# Patient Record
Sex: FEMALE | Race: WHITE | NOT HISPANIC OR LATINO | Employment: PART TIME | URBAN - METROPOLITAN AREA
[De-identification: names, ages, dates, MRNs, and addresses within clinical notes are randomized per-mention and may not be internally consistent; named-entity substitution may affect disease eponyms.]

---

## 2018-11-13 PROBLEM — N81.10 FEMALE CYSTOCELE: Status: ACTIVE | Noted: 2018-11-13

## 2018-11-13 PROBLEM — N39.41 URGE INCONTINENCE: Status: ACTIVE | Noted: 2018-11-13

## 2018-11-13 PROBLEM — R39.15 URGENCY OF MICTURITION: Status: ACTIVE | Noted: 2018-11-13

## 2018-11-13 PROBLEM — R35.0 FREQUENCY OF MICTURITION: Status: ACTIVE | Noted: 2018-11-13

## 2023-10-17 ENCOUNTER — OFFICE VISIT (OUTPATIENT)
Dept: ORTHOPEDICS | Facility: CLINIC | Age: 63
End: 2023-10-17
Payer: COMMERCIAL

## 2023-10-17 ENCOUNTER — HOSPITAL ENCOUNTER (OUTPATIENT)
Dept: RADIOLOGY | Facility: HOSPITAL | Age: 63
Discharge: HOME | End: 2023-10-17
Attending: ORTHOPAEDIC SURGERY
Payer: COMMERCIAL

## 2023-10-17 VITALS — BODY MASS INDEX: 28.63 KG/M2 | WEIGHT: 188.94 LBS | HEIGHT: 68 IN

## 2023-10-17 DIAGNOSIS — M20.41 HAMMER TOE OF RIGHT FOOT: ICD-10-CM

## 2023-10-17 DIAGNOSIS — R52 PAIN: Primary | ICD-10-CM

## 2023-10-17 DIAGNOSIS — R52 PAIN: ICD-10-CM

## 2023-10-17 PROCEDURE — 1159F PR MEDICATION LIST DOCUMENTED IN MEDICAL RECORD: ICD-10-PCS | Mod: CPTII,S$GLB,, | Performed by: ORTHOPAEDIC SURGERY

## 2023-10-17 PROCEDURE — 3008F PR BODY MASS INDEX (BMI) DOCUMENTED: ICD-10-PCS | Mod: CPTII,S$GLB,, | Performed by: ORTHOPAEDIC SURGERY

## 2023-10-17 PROCEDURE — 73630 X-RAY EXAM OF FOOT: CPT | Mod: TC,RT

## 2023-10-17 PROCEDURE — 99999 PR PBB SHADOW E&M-NEW PATIENT-LVL III: CPT | Mod: PBBFAC,,, | Performed by: ORTHOPAEDIC SURGERY

## 2023-10-17 PROCEDURE — 99203 PR OFFICE/OUTPT VISIT, NEW, LEVL III, 30-44 MIN: ICD-10-PCS | Mod: S$GLB,,, | Performed by: ORTHOPAEDIC SURGERY

## 2023-10-17 PROCEDURE — 99203 OFFICE O/P NEW LOW 30 MIN: CPT | Mod: S$GLB,,, | Performed by: ORTHOPAEDIC SURGERY

## 2023-10-17 PROCEDURE — 1159F MED LIST DOCD IN RCRD: CPT | Mod: CPTII,S$GLB,, | Performed by: ORTHOPAEDIC SURGERY

## 2023-10-17 PROCEDURE — 3008F BODY MASS INDEX DOCD: CPT | Mod: CPTII,S$GLB,, | Performed by: ORTHOPAEDIC SURGERY

## 2023-10-17 PROCEDURE — 73630 X-RAY EXAM OF FOOT: CPT | Mod: 26,RT,, | Performed by: RADIOLOGY

## 2023-10-17 PROCEDURE — 99999 PR PBB SHADOW E&M-NEW PATIENT-LVL III: ICD-10-PCS | Mod: PBBFAC,,, | Performed by: ORTHOPAEDIC SURGERY

## 2023-10-17 PROCEDURE — 73630 XR FOOT COMPLETE 3 VIEW RIGHT: ICD-10-PCS | Mod: 26,RT,, | Performed by: RADIOLOGY

## 2023-11-16 DIAGNOSIS — M20.41 HAMMER TOE OF RIGHT FOOT: Primary | ICD-10-CM

## 2023-11-16 DIAGNOSIS — R52 PAIN: ICD-10-CM

## 2023-11-16 RX ORDER — MUPIROCIN 20 MG/G
OINTMENT TOPICAL
Status: CANCELLED | OUTPATIENT
Start: 2023-11-16

## 2023-11-16 RX ORDER — CEFAZOLIN SODIUM 2 G/50ML
2 SOLUTION INTRAVENOUS
Status: CANCELLED | OUTPATIENT
Start: 2023-11-16

## 2023-12-04 ENCOUNTER — OFFICE VISIT (OUTPATIENT)
Dept: ORTHOPEDICS | Facility: CLINIC | Age: 63
End: 2023-12-04
Payer: COMMERCIAL

## 2023-12-04 VITALS — BODY MASS INDEX: 32.08 KG/M2 | WEIGHT: 211 LBS

## 2023-12-04 DIAGNOSIS — M20.41 HAMMER TOE OF RIGHT FOOT: Primary | ICD-10-CM

## 2023-12-04 PROCEDURE — 99499 NO LOS: ICD-10-PCS | Mod: S$GLB,,, | Performed by: PHYSICIAN ASSISTANT

## 2023-12-04 PROCEDURE — 99499 UNLISTED E&M SERVICE: CPT | Mod: S$GLB,,, | Performed by: PHYSICIAN ASSISTANT

## 2023-12-04 PROCEDURE — 99999 PR PBB SHADOW E&M-EST. PATIENT-LVL IV: ICD-10-PCS | Mod: PBBFAC,,, | Performed by: PHYSICIAN ASSISTANT

## 2023-12-04 PROCEDURE — 99999 PR PBB SHADOW E&M-EST. PATIENT-LVL IV: CPT | Mod: PBBFAC,,, | Performed by: PHYSICIAN ASSISTANT

## 2023-12-04 RX ORDER — OXYCODONE HYDROCHLORIDE 5 MG/1
5 TABLET ORAL EVERY 6 HOURS PRN
Qty: 15 TABLET | Refills: 0 | Status: SHIPPED | OUTPATIENT
Start: 2023-12-04

## 2023-12-04 RX ORDER — GABAPENTIN 300 MG/1
300 CAPSULE ORAL NIGHTLY
Qty: 42 CAPSULE | Refills: 0 | Status: SHIPPED | OUTPATIENT
Start: 2023-12-04 | End: 2024-01-25 | Stop reason: SDUPTHER

## 2023-12-04 RX ORDER — ACETAMINOPHEN 500 MG
1000 TABLET ORAL 3 TIMES DAILY
Qty: 42 TABLET | Refills: 0 | Status: SHIPPED | OUTPATIENT
Start: 2023-12-04 | End: 2023-12-20

## 2023-12-04 RX ORDER — NAPROXEN 500 MG/1
500 TABLET ORAL 2 TIMES DAILY WITH MEALS
Qty: 28 TABLET | Refills: 0 | Status: SHIPPED | OUTPATIENT
Start: 2023-12-04 | End: 2023-12-27

## 2023-12-04 RX ORDER — ONDANSETRON 4 MG/1
4 TABLET, FILM COATED ORAL EVERY 8 HOURS PRN
Qty: 15 TABLET | Refills: 0 | Status: SHIPPED | OUTPATIENT
Start: 2023-12-04

## 2023-12-04 NOTE — H&P (VIEW-ONLY)
Subjective:      Patient ID: Amanda Valdez is a 63 y.o. female.    Chief Complaint: Pre-op Exam and Pain of the Right Foot    Amanda is a 63 y.o. female here today for a pre-operative visit in preparation for a right 3rd toe hammertoe correction to be performed by Dr. Lawrence on 12/13/23.  She was last seen and treated in the clinic on 10/17/23.  There has been no significant change in their past medical or orthopedic status since the previous visit.  No fever, chills, malaise or unexplained weight change.     Past Medical History:   Diagnosis Date    Allergic sinusitis     Curvature of spine     Elbow injury 12/2018    RIGHT     Female cystocele     General anesthetics causing adverse effect in therapeutic use     DELAYED EMERGENCE    MRSA infection 05/2018    CULTURE DONE 12/14/18    PONV (postoperative nausea and vomiting)     Urinary urgency     Wears glasses      Past Surgical History:   Procedure Laterality Date    BLADDER SUSPENSION  2014    COLONOSCOPY      CREATION OF ANTERIOR VAGINAL WALL SLING N/A 12/20/2018    Procedure: SLING-ANTERIOR VAGINAL WALL;  Surgeon: Austin Voss MD;  Location: [facility] OR;  Service: Urology;  Laterality: N/A;    DILATION OF URETHRA N/A 12/20/2018    Procedure: DILATION, URETHRA;  Surgeon: Austin Voss MD;  Location: [facility] OR;  Service: Urology;  Laterality: N/A;    HYSTERECTOMY      INCISION AND DRAINAGE OF WOUND Right     LEG, SPIDER BITE    PUBOVAGINAL SLING  12/20/2018    with urethral dilation     Social History     Occupational History    Not on file   Tobacco Use    Smoking status: Never    Smokeless tobacco: Never   Substance and Sexual Activity    Alcohol use: No    Drug use: No    Sexual activity: Not on file      Review of Systems   Constitutional: Negative for chills and fever.   HENT:  Negative for congestion.    Eyes:  Negative for redness.   Respiratory:  Negative for shortness of breath.    Skin:  Negative for rash.   Gastrointestinal:  Negative for  abdominal pain, nausea and vomiting.   Neurological:  Negative for dizziness and seizures.   Psychiatric/Behavioral:  Negative for altered mental status.      Current Outpatient Medications on File Prior to Visit   Medication Sig Dispense Refill    alpha lipoic acid 300 mg Cap Take 1 tablet by mouth every morning.      cholecalciferol, vitamin D3, (VITAMIN D3 ORAL) Take 5,000 Int'l Units by mouth once daily.      coenzyme Q10 (CO Q-10) 100 mg capsule Take 100 mg by mouth once daily.      estradiol (ESTRACE) 1 MG tablet Take 1 mg by mouth every morning.      FLAXSEED OIL-OMEGA 3,6,9 ORAL Take 1 capsule by mouth every morning.      glucosamine/chondr nunez A sod (OSTEO BI-FLEX ORAL) Take 1 tablet by mouth once daily.      oxybutynin (DITROPAN-XL) 10 MG 24 hr tablet Take 10 mg by mouth every morning.      vitamin B complex (B COMPLEX ORAL) Take 1 tablet by mouth once daily.      [DISCONTINUED] amoxicillin-clavulanate 875-125mg (AUGMENTIN) 875-125 mg per tablet Take 1 tablet by mouth every 12 (twelve) hours. TX RECENT SINUS CONGESTION, TAKE UNTIL COMPLETED      [DISCONTINUED] montelukast (SINGULAIR) 10 mg tablet Take 10 mg by mouth every evening. RECENT TX SINUS CONGESTION       No current facility-administered medications on file prior to visit.     Review of patient's allergies indicates:  No Known Allergies      Objective:      Vitals:    12/04/23 1056   Weight: 95.7 kg (211 lb)     Ortho Exam     Gen: WD, WN in NAD   HEENT: NC/AT   Heart: RR   Resp: unlabored breathing   Skin: intact, no lesions pertinent to the surgical site   Assessment:       1. Hammer toe of right foot, 3rd toe          Plan:       Surgery per Dr. Lawrence

## 2023-12-04 NOTE — PROGRESS NOTES
Amanda is a 63 y.o. female here today for a pre-operative visit in preparation for a right 3rd toe hammertoe correction to be performed by Dr. Lawrence on 12/13/23.  She was last seen and treated in the clinic on 10/17/23.  There has been no significant change in their past medical or orthopedic status since the previous visit.  No fever, chills, malaise or unexplained weight change.     Allergies, medications, past medical history and past surgical history were reviewed with the patient.     Physical examination was performed.     Current care, treatment plan, precautions, activity level/ modifications, limitations, rehabilitation exercises and proposed future treatment were discussed with the patient. We discussed the need to monitor for changes in symptoms and condition and report them to the physician.    Consents were signed.   Patient has crutches and scooter and will bring with them the day of surgery. They have been counseled on proper use of the assistive device.   Post operative pain medications sent to [facility]  Patient mentioned significant post op N/V.  Advised to inform Anesthesia and zofran prescription provided    -Discussed COVID19 with the patient, they are aware of our current policies and procedures, were given the option of delaying surgery, and they elect to proceed.        Pre, gianluca, and post operative procedure and expectations were discussed.  Questions were answered. The patient has been educated and is ready to proceed with surgery.  Approximately 30 minutes was spent discussing surgical outcomes, plans, procedures, pre, gianluca, and post operative expectations and care. The risks, benefits and alternatives to surgery were discussed with the patient at great length.  These include bleeding, infection, vessel/nerve damage, pain, numbness, tingling, complex regional pain syndrome, hardware/surgical failure, need for further surgery, malunion, nonunion, DVT, PE, arthritis and death. He also  understands that the risks of surgery may be greater for some patients due to health or lifestyle issues, such as a current condition or a history of heart disease, obesity, clotting disorders, recurrent infections, smoking, sedentary lifestyle, or noncompliance with medications, therapy, or followup. The degree of the increased risk is hard to estimate with any degree of precision.  Patient states an understanding and wishes to proceed with surgery.   All questions were answered.  No guarantees were implied or stated.  Informed consent was obtained  The patient will contact us if the have any questions, concerns, and changes in their medical condition prior to surgery.

## 2023-12-04 NOTE — H&P
Subjective:      Patient ID: Amanda Valdez is a 63 y.o. female.    Chief Complaint: Pre-op Exam and Pain of the Right Foot    Amanda is a 63 y.o. female here today for a pre-operative visit in preparation for a right 3rd toe hammertoe correction to be performed by Dr. Lawrence on 12/13/23.  She was last seen and treated in the clinic on 10/17/23.  There has been no significant change in their past medical or orthopedic status since the previous visit.  No fever, chills, malaise or unexplained weight change.     Past Medical History:   Diagnosis Date    Allergic sinusitis     Curvature of spine     Elbow injury 12/2018    RIGHT     Female cystocele     General anesthetics causing adverse effect in therapeutic use     DELAYED EMERGENCE    MRSA infection 05/2018    CULTURE DONE 12/14/18    PONV (postoperative nausea and vomiting)     Urinary urgency     Wears glasses      Past Surgical History:   Procedure Laterality Date    BLADDER SUSPENSION  2014    COLONOSCOPY      CREATION OF ANTERIOR VAGINAL WALL SLING N/A 12/20/2018    Procedure: SLING-ANTERIOR VAGINAL WALL;  Surgeon: Austni Voss MD;  Location: [facility] OR;  Service: Urology;  Laterality: N/A;    DILATION OF URETHRA N/A 12/20/2018    Procedure: DILATION, URETHRA;  Surgeon: Austin Voss MD;  Location: [facility] OR;  Service: Urology;  Laterality: N/A;    HYSTERECTOMY      INCISION AND DRAINAGE OF WOUND Right     LEG, SPIDER BITE    PUBOVAGINAL SLING  12/20/2018    with urethral dilation     Social History     Occupational History    Not on file   Tobacco Use    Smoking status: Never    Smokeless tobacco: Never   Substance and Sexual Activity    Alcohol use: No    Drug use: No    Sexual activity: Not on file      Review of Systems   Constitutional: Negative for chills and fever.   HENT:  Negative for congestion.    Eyes:  Negative for redness.   Respiratory:  Negative for shortness of breath.    Skin:  Negative for rash.   Gastrointestinal:  Negative for  abdominal pain, nausea and vomiting.   Neurological:  Negative for dizziness and seizures.   Psychiatric/Behavioral:  Negative for altered mental status.      Current Outpatient Medications on File Prior to Visit   Medication Sig Dispense Refill    alpha lipoic acid 300 mg Cap Take 1 tablet by mouth every morning.      cholecalciferol, vitamin D3, (VITAMIN D3 ORAL) Take 5,000 Int'l Units by mouth once daily.      coenzyme Q10 (CO Q-10) 100 mg capsule Take 100 mg by mouth once daily.      estradiol (ESTRACE) 1 MG tablet Take 1 mg by mouth every morning.      FLAXSEED OIL-OMEGA 3,6,9 ORAL Take 1 capsule by mouth every morning.      glucosamine/chondr nunez A sod (OSTEO BI-FLEX ORAL) Take 1 tablet by mouth once daily.      oxybutynin (DITROPAN-XL) 10 MG 24 hr tablet Take 10 mg by mouth every morning.      vitamin B complex (B COMPLEX ORAL) Take 1 tablet by mouth once daily.      [DISCONTINUED] amoxicillin-clavulanate 875-125mg (AUGMENTIN) 875-125 mg per tablet Take 1 tablet by mouth every 12 (twelve) hours. TX RECENT SINUS CONGESTION, TAKE UNTIL COMPLETED      [DISCONTINUED] montelukast (SINGULAIR) 10 mg tablet Take 10 mg by mouth every evening. RECENT TX SINUS CONGESTION       No current facility-administered medications on file prior to visit.     Review of patient's allergies indicates:  No Known Allergies      Objective:      Vitals:    12/04/23 1056   Weight: 95.7 kg (211 lb)     Ortho Exam     Gen: WD, WN in NAD   HEENT: NC/AT   Heart: RR   Resp: unlabored breathing   Skin: intact, no lesions pertinent to the surgical site   Assessment:       1. Hammer toe of right foot, 3rd toe          Plan:       Surgery per Dr. Lawrence

## 2023-12-11 ENCOUNTER — PATIENT MESSAGE (OUTPATIENT)
Dept: PREADMISSION TESTING | Facility: HOSPITAL | Age: 63
End: 2023-12-11
Payer: COMMERCIAL

## 2023-12-11 NOTE — ANESTHESIA PAT ROS NOTE
12/11/2023  Amanda Valdez is a 63 y.o., female.      Pre-op Assessment    I have reviewed the Patient Summary Reports.       I have reviewed the Medications.     Review of Systems  Anesthesia Hx:  No problems with previous Anesthesia   History of prior surgery of interest to airway management or planning:  Previous anesthesia: General 12/20/2018  Anterior Vag Wall Sling, dilation of Urethra with general anesthesia.  Procedure performed at an [facility].      Airway issues documented on chart review include mask, easy, laryngeal mask airway used     Denies Family Hx of Anesthesia complications.   Personal Hx of Anesthesia complications, Post-Operative Nausea/Vomiting, in the past, but not with recent anesthetics / prophylaxis          Slow To Awaken/Delayed Emergence and mild, somewhat sensitive to sedation / narcotics          Social:  Non-Smoker, No Alcohol Use       Hematology/Oncology:    Oncology Normal    -- Denies Anemia:               Hematology Comments: H/O MRSA infection, culture done 12/14/2018                    EENT/Dental:  chronic allergic rhinitis Allergic sinusitis, wears glasses          Cardiovascular:  Exercise tolerance: good       Denies CAD.       Denies Angina.    PVD   Denies LANTIGUA.  ECG has been reviewed. Edema, Varicose veins of unspecified lower extremity with other complications, venous reflux study reviewed with sig reflux  compression stockings 15-20 mmHg during day only, elevate legs at rest as tolerated, Elevated triglycerides per outside lab                           Pulmonary:  Pulmonary Normal    Denies Asthma.   Denies Shortness of breath.                  Renal/:   Denies Chronic Renal Disease.   H/O Female cystocele,  Urinary urgency, S/P Bladder Suspension, Creation of Anterior Vaginal Wall Sling, Dilation of Urethra             Hepatic/GI:  Hepatic/GI Normal      Denies GERD. Denies Liver Disease.            Musculoskeletal:     Hammer toe of right foot, 3rd toe, Pain,   Curvature of spine, H/O right elbow injury              OB/GYN/PEDS:  Hysterectomy           Neurological:    Denies CVA.    Denies Headaches. Denies Seizures.    Other specified mononeuropathies of right lower limb,  H/O right 1st metatarsal osteotomy and right 2nd crossover hammer toe correction,  Knee Surgery      Peripheral Neuropathy                          Endocrine:  Denies Diabetes. Denies Hypothyroidism.        Obesity / BMI > 30  Dermatological:  I&D of wound on right leg- spider bite   Psych:  Psychiatric Normal                   Past Medical History:   Diagnosis Date    Allergic sinusitis     Curvature of spine     Elbow injury 12/2018    RIGHT     Female cystocele     General anesthetics causing adverse effect in therapeutic use     DELAYED EMERGENCE    MRSA infection 05/2018    CULTURE DONE 12/14/18    PONV (postoperative nausea and vomiting)     Urinary urgency     Wears glasses      Past Surgical History:   Procedure Laterality Date    BLADDER SUSPENSION  2014    COLONOSCOPY      CREATION OF ANTERIOR VAGINAL WALL SLING N/A 12/20/2018    Procedure: SLING-ANTERIOR VAGINAL WALL;  Surgeon: Austin Voss MD;  Location: [facility] OR;  Service: Urology;  Laterality: N/A;    DILATION OF URETHRA N/A 12/20/2018    Procedure: DILATION, URETHRA;  Surgeon: Austin Voss MD;  Location: [facility] OR;  Service: Urology;  Laterality: N/A;    HYSTERECTOMY      INCISION AND DRAINAGE OF WOUND Right     LEG, SPIDER BITE    PUBOVAGINAL SLING  12/20/2018    with urethral dilation       Anesthesia Assessment: Preoperative EQUATION    Planned Procedure: Procedure(s) (LRB):  CORRECTION, HAMMER TOE 3rd toe (Right)  Requested Anesthesia Type:Choice  Surgeon: Austin Lawrence MD  Service: Orthopedics  Known or anticipated Date of Surgery:12/13/2023    Surgeon notes: reviewed    Electronic QUestionnaire Assessment  completed via nurse interview with patient.        Triage considerations:     The patient has no apparent active cardiac condition (No unstable coronary Syndrome such as severe unstable angina or recent [<1 month] myocardial infarction, decompensated CHF, severe valvular   disease or significant arrhythmia)    Previous anesthesia records:LMA General, Easy airway, No problems, and Hx PONV    Last PCP note: 3-6 months ago , outside [facility]   Subspecialty notes: Vascular Surgery    Other important co-morbidities: Obesity       EKG 4/26/2022:  Vent. Rate : 058 BPM     Atrial Rate : 058 BPM      P-R Int : 158 ms          QRS Dur : 098 ms       QT Int : 434 ms       P-R-T Axes : 070 068 069 degrees      QTc Int : 426 ms   Sinus bradycardia   Otherwise normal ECG   No previous ECGs available   Confirmed by Toyin Castillo MD ([id number]) on 4/26/2022 12:16:13 PM        Echo 8/4/2023:  1.The study quality is average. 2.The left ventricle is normal in size. Global left ventricular systolic function is normal. The left ventricular ejection fraction is 60%. Left ventricular diastolic function is normal. 3.Mild (1+) pulmonic regurgitation. 4.The pulmonary artery systolic pressure is 28 mmHg.                Lower Extremity Ultrasound 8/2/2023:  1.The study quality is average. 2.Patent bilateral lower extremity deep veins with no evidence of thrombus noted. 3.Left deep reflux: 1.2 sec in the common femoral vein, 1.5 sec in the femoral vein, and 13 sec in the popliteal vein.4.Reflux is noted in the right GSV and SSV.5.Varicosities are noted in the right thigh with 2.1 sec of reflux measuring 4.6mm.  Large  noted at the right upper calf with 0.7 sec of reflux measuring 3.6mm.  Large  noted at the right mid lower calf with 0.7 sec of reflux measuring 5.2mm.6.Reflux is noted in the left GSV and SSV.7.Varicosities are noted in the left calf with 1.6 sec of reflux measuring 3.5mm.   noted at the left upper  calf with 0.8 sec of reflux measuring 2.3mm.  Large peforator noted at the left mid calf with 0.5 sec of reflux measuring 3.6mm.        Instructions given. (See in Nurse's note)      Ht: 5'8  Wt: 211 lb  BMI: 32.08  Vaccinated

## 2023-12-12 ENCOUNTER — TELEPHONE (OUTPATIENT)
Dept: ORTHOPEDICS | Facility: CLINIC | Age: 63
End: 2023-12-12
Payer: COMMERCIAL

## 2023-12-12 ENCOUNTER — ANESTHESIA EVENT (OUTPATIENT)
Dept: SURGERY | Facility: HOSPITAL | Age: 63
End: 2023-12-12
Payer: COMMERCIAL

## 2023-12-12 NOTE — TELEPHONE ENCOUNTER
I spoke with pt,confirmed surgery arrival time of 10:10am.Joan MCKINNEY.nothing to eat or drink after midnight. Pt,verbalized understanding.

## 2023-12-13 ENCOUNTER — ANESTHESIA (OUTPATIENT)
Dept: SURGERY | Facility: HOSPITAL | Age: 63
End: 2023-12-13
Payer: COMMERCIAL

## 2023-12-13 ENCOUNTER — HOSPITAL ENCOUNTER (OUTPATIENT)
Facility: HOSPITAL | Age: 63
Discharge: HOME | End: 2023-12-13
Attending: ORTHOPAEDIC SURGERY | Admitting: ORTHOPAEDIC SURGERY
Payer: COMMERCIAL

## 2023-12-13 VITALS
TEMPERATURE: 98 F | WEIGHT: 211 LBS | SYSTOLIC BLOOD PRESSURE: 142 MMHG | HEART RATE: 63 BPM | RESPIRATION RATE: 17 BRPM | DIASTOLIC BLOOD PRESSURE: 69 MMHG | OXYGEN SATURATION: 99 % | BODY MASS INDEX: 31.98 KG/M2 | HEIGHT: 68 IN

## 2023-12-13 DIAGNOSIS — M20.41 HAMMER TOE OF RIGHT FOOT: ICD-10-CM

## 2023-12-13 DIAGNOSIS — R52 PAIN: ICD-10-CM

## 2023-12-13 PROCEDURE — 28285 PR REPAIR OF HAMMERTOE,ONE: ICD-10-PCS | Mod: T7,,, | Performed by: ORTHOPAEDIC SURGERY

## 2023-12-13 PROCEDURE — 28285 REPAIR OF HAMMERTOE: CPT | Mod: T7,,, | Performed by: ORTHOPAEDIC SURGERY

## 2023-12-13 PROCEDURE — 63600175 PHARM REV CODE 636 W HCPCS: Performed by: ANESTHESIOLOGY

## 2023-12-13 PROCEDURE — 99900035 HC TECH TIME PER 15 MIN (STAT)

## 2023-12-13 PROCEDURE — 64450 POPLITEAL SINGLE SHOT NERVE BLOCK: ICD-10-PCS | Mod: 59,RT,, | Performed by: ANESTHESIOLOGY

## 2023-12-13 PROCEDURE — 64447: ICD-10-PCS | Mod: 59,RT,, | Performed by: ANESTHESIOLOGY

## 2023-12-13 PROCEDURE — D9220A PRA ANESTHESIA: Mod: CRNA,,, | Performed by: NURSE ANESTHETIST, CERTIFIED REGISTERED

## 2023-12-13 PROCEDURE — C1713 ANCHOR/SCREW BN/BN,TIS/BN: HCPCS | Performed by: ORTHOPAEDIC SURGERY

## 2023-12-13 PROCEDURE — 37000008 HC ANESTHESIA 1ST 15 MINUTES: Performed by: ORTHOPAEDIC SURGERY

## 2023-12-13 PROCEDURE — 27201423 OPTIME MED/SURG SUP & DEVICES STERILE SUPPLY: Performed by: ORTHOPAEDIC SURGERY

## 2023-12-13 PROCEDURE — 25000003 PHARM REV CODE 250: Performed by: ORTHOPAEDIC SURGERY

## 2023-12-13 PROCEDURE — 63600175 PHARM REV CODE 636 W HCPCS: Performed by: NURSE ANESTHETIST, CERTIFIED REGISTERED

## 2023-12-13 PROCEDURE — 71000015 HC POSTOP RECOV 1ST HR: Performed by: ORTHOPAEDIC SURGERY

## 2023-12-13 PROCEDURE — D9220A PRA ANESTHESIA: ICD-10-PCS | Mod: ANES,,, | Performed by: ANESTHESIOLOGY

## 2023-12-13 PROCEDURE — 64447 NJX AA&/STRD FEMORAL NRV IMG: CPT | Performed by: ANESTHESIOLOGY

## 2023-12-13 PROCEDURE — 94761 N-INVAS EAR/PLS OXIMETRY MLT: CPT

## 2023-12-13 PROCEDURE — 36000707: Performed by: ORTHOPAEDIC SURGERY

## 2023-12-13 PROCEDURE — D9220A PRA ANESTHESIA: ICD-10-PCS | Mod: CRNA,,, | Performed by: NURSE ANESTHETIST, CERTIFIED REGISTERED

## 2023-12-13 PROCEDURE — 25000003 PHARM REV CODE 250: Performed by: NURSE ANESTHETIST, CERTIFIED REGISTERED

## 2023-12-13 PROCEDURE — 36000706: Performed by: ORTHOPAEDIC SURGERY

## 2023-12-13 PROCEDURE — 63600175 PHARM REV CODE 636 W HCPCS: Performed by: ORTHOPAEDIC SURGERY

## 2023-12-13 PROCEDURE — 64445 NJX AA&/STRD SCIATIC NRV IMG: CPT | Performed by: ANESTHESIOLOGY

## 2023-12-13 PROCEDURE — 64450 NJX AA&/STRD OTHER PN/BRANCH: CPT | Mod: 59,RT,, | Performed by: ANESTHESIOLOGY

## 2023-12-13 PROCEDURE — D9220A PRA ANESTHESIA: Mod: ANES,,, | Performed by: ANESTHESIOLOGY

## 2023-12-13 PROCEDURE — 37000009 HC ANESTHESIA EA ADD 15 MINS: Performed by: ORTHOPAEDIC SURGERY

## 2023-12-13 PROCEDURE — 25000003 PHARM REV CODE 250: Performed by: ANESTHESIOLOGY

## 2023-12-13 PROCEDURE — 71000033 HC RECOVERY, INTIAL HOUR: Performed by: ORTHOPAEDIC SURGERY

## 2023-12-13 DEVICE — IMPLANTABLE DEVICE: Type: IMPLANTABLE DEVICE | Site: FOOT | Status: FUNCTIONAL

## 2023-12-13 RX ORDER — LIDOCAINE HYDROCHLORIDE 20 MG/ML
INJECTION INTRAVENOUS
Status: DISCONTINUED | OUTPATIENT
Start: 2023-12-13 | End: 2023-12-13

## 2023-12-13 RX ORDER — FAMOTIDINE 10 MG/ML
INJECTION INTRAVENOUS
Status: DISCONTINUED | OUTPATIENT
Start: 2023-12-13 | End: 2023-12-13

## 2023-12-13 RX ORDER — SCOLOPAMINE TRANSDERMAL SYSTEM 1 MG/1
PATCH, EXTENDED RELEASE TRANSDERMAL
Status: DISCONTINUED
Start: 2023-12-13 | End: 2023-12-13 | Stop reason: HOSPADM

## 2023-12-13 RX ORDER — ROPIVACAINE HYDROCHLORIDE 5 MG/ML
INJECTION, SOLUTION EPIDURAL; INFILTRATION; PERINEURAL
Status: COMPLETED | OUTPATIENT
Start: 2023-12-13 | End: 2023-12-13

## 2023-12-13 RX ORDER — ACETAMINOPHEN 500 MG
1000 TABLET ORAL ONCE
Status: COMPLETED | OUTPATIENT
Start: 2023-12-13 | End: 2023-12-13

## 2023-12-13 RX ORDER — MUPIROCIN 20 MG/G
OINTMENT TOPICAL 2 TIMES DAILY
Status: DISCONTINUED | OUTPATIENT
Start: 2023-12-13 | End: 2023-12-13 | Stop reason: HOSPADM

## 2023-12-13 RX ORDER — FENTANYL CITRATE 50 UG/ML
25-200 INJECTION, SOLUTION INTRAMUSCULAR; INTRAVENOUS
Status: DISCONTINUED | OUTPATIENT
Start: 2023-12-13 | End: 2023-12-13 | Stop reason: HOSPADM

## 2023-12-13 RX ORDER — MUPIROCIN 20 MG/G
OINTMENT TOPICAL
Status: DISCONTINUED | OUTPATIENT
Start: 2023-12-13 | End: 2023-12-13 | Stop reason: HOSPADM

## 2023-12-13 RX ORDER — PROPOFOL 10 MG/ML
VIAL (ML) INTRAVENOUS CONTINUOUS PRN
Status: DISCONTINUED | OUTPATIENT
Start: 2023-12-13 | End: 2023-12-13

## 2023-12-13 RX ORDER — PROPOFOL 10 MG/ML
VIAL (ML) INTRAVENOUS
Status: DISCONTINUED | OUTPATIENT
Start: 2023-12-13 | End: 2023-12-13

## 2023-12-13 RX ORDER — ONDANSETRON 2 MG/ML
INJECTION INTRAMUSCULAR; INTRAVENOUS
Status: DISCONTINUED | OUTPATIENT
Start: 2023-12-13 | End: 2023-12-13

## 2023-12-13 RX ORDER — SODIUM CHLORIDE 0.9 % (FLUSH) 0.9 %
10 SYRINGE (ML) INJECTION
Status: DISCONTINUED | OUTPATIENT
Start: 2023-12-13 | End: 2023-12-13 | Stop reason: HOSPADM

## 2023-12-13 RX ORDER — CELECOXIB 200 MG/1
400 CAPSULE ORAL ONCE
Status: COMPLETED | OUTPATIENT
Start: 2023-12-13 | End: 2023-12-13

## 2023-12-13 RX ORDER — SCOLOPAMINE TRANSDERMAL SYSTEM 1 MG/1
1 PATCH, EXTENDED RELEASE TRANSDERMAL
Status: DISCONTINUED | OUTPATIENT
Start: 2023-12-13 | End: 2023-12-13 | Stop reason: HOSPADM

## 2023-12-13 RX ORDER — MIDAZOLAM HYDROCHLORIDE 1 MG/ML
.5-4 INJECTION INTRAMUSCULAR; INTRAVENOUS
Status: DISCONTINUED | OUTPATIENT
Start: 2023-12-13 | End: 2023-12-13 | Stop reason: HOSPADM

## 2023-12-13 RX ORDER — ONDANSETRON 2 MG/ML
4 INJECTION INTRAMUSCULAR; INTRAVENOUS ONCE AS NEEDED
Status: CANCELLED | OUTPATIENT
Start: 2023-12-13 | End: 2035-05-11

## 2023-12-13 RX ORDER — SODIUM CHLORIDE 0.9 % (FLUSH) 0.9 %
3 SYRINGE (ML) INJECTION
Status: CANCELLED | OUTPATIENT
Start: 2023-12-13

## 2023-12-13 RX ADMIN — FAMOTIDINE 20 MG: 10 INJECTION, SOLUTION INTRAVENOUS at 01:12

## 2023-12-13 RX ADMIN — ONDANSETRON 4 MG: 2 INJECTION INTRAMUSCULAR; INTRAVENOUS at 01:12

## 2023-12-13 RX ADMIN — PROPOFOL 50 MG: 10 INJECTION, EMULSION INTRAVENOUS at 01:12

## 2023-12-13 RX ADMIN — SODIUM CHLORIDE: 9 INJECTION, SOLUTION INTRAVENOUS at 12:12

## 2023-12-13 RX ADMIN — MUPIROCIN: 20 OINTMENT TOPICAL at 10:12

## 2023-12-13 RX ADMIN — LIDOCAINE HYDROCHLORIDE 75 MG: 20 INJECTION INTRAVENOUS at 01:12

## 2023-12-13 RX ADMIN — CELECOXIB 400 MG: 200 CAPSULE ORAL at 10:12

## 2023-12-13 RX ADMIN — ROPIVACAINE HYDROCHLORIDE 30 ML: 5 INJECTION EPIDURAL; INFILTRATION; PERINEURAL at 12:12

## 2023-12-13 RX ADMIN — MIDAZOLAM 1 MG: 1 INJECTION INTRAMUSCULAR; INTRAVENOUS at 12:12

## 2023-12-13 RX ADMIN — ROPIVACAINE HYDROCHLORIDE 10 ML: 5 INJECTION, SOLUTION EPIDURAL; INFILTRATION; PERINEURAL at 12:12

## 2023-12-13 RX ADMIN — PROPOFOL 100 MCG/KG/MIN: 10 INJECTION, EMULSION INTRAVENOUS at 01:12

## 2023-12-13 RX ADMIN — CEFAZOLIN 2 G: 2 INJECTION, POWDER, FOR SOLUTION INTRAMUSCULAR; INTRAVENOUS at 01:12

## 2023-12-13 RX ADMIN — ACETAMINOPHEN 1000 MG: 500 TABLET ORAL at 10:12

## 2023-12-13 NOTE — ANESTHESIA PROCEDURE NOTES
Popliteal Single Shot Nerve Block    Patient location during procedure: pre-op   Block not for primary anesthetic.  Reason for block: at surgeon's request and post-op pain management   Post-op Pain Location: R foot pain   Start time: 12/13/2023 12:25 PM  Timeout: 12/13/2023 12:25 PM   End time: 12/13/2023 12:27 PM    Staffing  Authorizing Provider: Je Petty MD  Performing Provider: Je Petty MD    Staffing  Performed by: Je Petty MD  Authorized by: eJ Petty MD    Preanesthetic Checklist  Completed: patient identified, IV checked, site marked, risks and benefits discussed, surgical consent, monitors and equipment checked, pre-op evaluation and timeout performed  Peripheral Block  Patient position: supine  Prep: ChloraPrep  Patient monitoring: heart rate, cardiac monitor, continuous pulse ox, continuous capnometry and frequent blood pressure checks  Block type: popliteal  Laterality: right  Injection technique: single shot  Needle  Needle type: Stimuplex   Needle gauge: 21 G  Needle length: 4 in  Needle localization: anatomical landmarks and ultrasound guidance   -ultrasound image captured on disc.  Assessment  Injection assessment: negative aspiration, negative parasthesia and local visualized surrounding nerve  Paresthesia pain: none  Heart rate change: no  Slow fractionated injection: yes  Pain Tolerance: comfortable throughout block and no complaints  Medications:    Medications: ropivacaine (NAROPIN) injection 0.5% - Perineural   30 mL - 12/13/2023 12:26:00 PM    Additional Notes  VSS.  [facility] RN monitoring vitals throughout procedure.  Patient tolerated procedure well.

## 2023-12-13 NOTE — ANESTHESIA PROCEDURE NOTES
Aductor Canal Single Shot Nerve Block    Patient location during procedure: pre-op   Block not for primary anesthetic.  Reason for block: at surgeon's request and post-op pain management   Post-op Pain Location: R foot pain   Start time: 12/13/2023 12:26 PM  Timeout: 12/13/2023 12:25 PM   End time: 12/13/2023 12:27 PM    Staffing  Authorizing Provider: Je Petty MD  Performing Provider: Je Petty MD    Staffing  Performed by: Je Petty MD  Authorized by: Je Petty MD    Preanesthetic Checklist  Completed: patient identified, IV checked, site marked, risks and benefits discussed, surgical consent, monitors and equipment checked, pre-op evaluation and timeout performed  Peripheral Block  Patient position: supine  Prep: ChloraPrep  Patient monitoring: heart rate, cardiac monitor, continuous pulse ox, continuous capnometry and frequent blood pressure checks  Block type: adductor canal  Laterality: right  Injection technique: single shot  Needle  Needle type: Stimuplex   Needle gauge: 21 G  Needle length: 4 in  Needle localization: anatomical landmarks and ultrasound guidance   -ultrasound image captured on disc.  Assessment  Injection assessment: negative aspiration, negative parasthesia and local visualized surrounding nerve  Paresthesia pain: none  Heart rate change: no  Slow fractionated injection: yes  Pain Tolerance: comfortable throughout block and no complaints  Medications:    Medications: ropivacaine (NAROPIN) injection 0.5% - Perineural   10 mL - 12/13/2023 12:27:00 PM    Additional Notes  Aded 1:300k epi.  VSS.  [facility] RN monitoring vitals throughout procedure.  Patient tolerated procedure well.

## 2023-12-13 NOTE — BRIEF OP NOTE
[facility] - Surgery ([facility])  Brief Operative Note    Surgery Date: 12/13/2023     Surgeon(s) and Role:     * Austin Lawrence MD - Primary    Assisting Surgeon: None    Pre-op Diagnosis:  Hammer toe of right foot [M20.41]  Pain [R52]    Post-op Diagnosis:  Post-Op Diagnosis Codes:     * Hammer toe of right foot [M20.41]     * Pain [R52]    Procedure(s) (LRB):  CORRECTION, HAMMER TOE 3rd toe (Right)    Anesthesia: Choice    Operative Findings: see op note    Estimated Blood Loss: * No values recorded between 12/13/2023  1:21 PM and 12/13/2023  1:53 PM *         Specimens:   Specimen (24h ago, onward)      None              Discharge Note    OUTCOME: Patient tolerated treatment/procedure well without complication and is now ready for discharge.    DISPOSITION: Home or Self Care    FINAL DIAGNOSIS:  <principal problem not specified>    FOLLOWUP: In clinic    DISCHARGE INSTRUCTIONS:    Discharge Procedure Orders   Diet general     Call MD for:  temperature >100.4     Call MD for:  persistent nausea and vomiting     Call MD for:  severe uncontrolled pain     Call MD for:  difficulty breathing, headache or visual disturbances     Call MD for:  redness, tenderness, or signs of infection (pain, swelling, redness, odor or green/yellow discharge around incision site)     Call MD for:  hives     Call MD for:  persistent dizziness or light-headedness     Call MD for:  extreme fatigue     Keep surgical extremity elevated     Ice to affected area     Leave dressing on - Keep it clean, dry, and intact until clinic visit     Weight bearing restrictions (specify)   Order Comments: Weight bearing on heel

## 2023-12-13 NOTE — PROGRESS NOTES
Pt stated she had cough drop on her way here (about an hour ago). Dr. Petty notified. He will talk to Dr. Lawrence.

## 2023-12-13 NOTE — PLAN OF CARE
VSS.  Patient tolerating oral liquids without difficulty. No apparent s&s of distress noted at this time, no complaints voiced at this time. Discharge instructions reviewed with patient and  with good verbal feedback received. Post op medications reviewed. Patient ready for discharge. Patient escorted to vehicle via wheelchair by RN with all belongings.

## 2023-12-13 NOTE — ANESTHESIA PREPROCEDURE EVALUATION
12/13/2023  Pre-operative evaluation for Procedure(s) (LRB):  CORRECTION, HAMMER TOE 3rd toe (Right)    Amanda Valdez is a 63 y.o. female     Patient Active Problem List   Diagnosis    Female cystocele    Frequency of micturition    Urgency of micturition    Urge incontinence       Review of patient's allergies indicates:  No Known Allergies    No current facility-administered medications on file prior to encounter.     Current Outpatient Medications on File Prior to Encounter   Medication Sig Dispense Refill    alpha lipoic acid 300 mg Cap Take 1 tablet by mouth every morning.      cholecalciferol, vitamin D3, (VITAMIN D3 ORAL) Take 5,000 Int'l Units by mouth once daily.      coenzyme Q10 (CO Q-10) 100 mg capsule Take 100 mg by mouth once daily.      estradiol (ESTRACE) 1 MG tablet Take 1 mg by mouth every morning.      FLAXSEED OIL-OMEGA 3,6,9 ORAL Take 1 capsule by mouth every morning.      glucosamine/chondr nunez A sod (OSTEO BI-FLEX ORAL) Take 1 tablet by mouth once daily.      oxybutynin (DITROPAN-XL) 10 MG 24 hr tablet Take 10 mg by mouth every morning.      vitamin B complex (B COMPLEX ORAL) Take 1 tablet by mouth once daily.         Past Surgical History:   Procedure Laterality Date    BLADDER SUSPENSION  2014    COLONOSCOPY      CREATION OF ANTERIOR VAGINAL WALL SLING N/A 12/20/2018    Procedure: SLING-ANTERIOR VAGINAL WALL;  Surgeon: Austin Voss MD;  [facility];  Service: Urology;  Laterality: N/A;    DILATION OF URETHRA N/A 12/20/2018    Procedure: DILATION, URETHRA;  Surgeon: Austin Voss MD;  [facility];  Service: Urology;  Laterality: N/A;    HYSTERECTOMY      INCISION AND DRAINAGE OF WOUND Right     LEG, SPIDER BITE    PUBOVAGINAL SLING  12/20/2018    with urethral dilation       Social History     Socioeconomic History    Marital status:    Tobacco Use     Smoking status: Never    Smokeless tobacco: Never   Substance and Sexual Activity    Alcohol use: No    Drug use: No     EKG:  Sinus bradycardia   Otherwise normal ECG   No previous ECGs available   Confirmed by Toyin Castillo MD ([id number]) on 4/26/2022 12:16:13 PM     2D Echo:  No results found for this or any previous visit.        Pre-op Assessment    I have reviewed the Patient Summary Reports.     I have reviewed the Nursing Notes. I have reviewed the NPO Status.   I have reviewed the Medications.     Review of Systems  Anesthesia Hx:             Denies Family Hx of Anesthesia complications.    Denies Personal Hx of Anesthesia complications.                    Cardiovascular:  Exercise tolerance: good        Denies Dysrhythmias.   Denies Angina.       Denies LANTIGUA.                            Pulmonary:    Denies COPD.  Denies Asthma.                    Renal/:   Denies Chronic Renal Disease.                Hepatic/GI:      Denies GERD.             Neurological:    Denies CVA.    Denies Seizures.                                Endocrine:  Denies Diabetes.               Physical Exam  General: Well nourished, Cooperative, Alert and Oriented    Airway:  Mallampati: II / I  Mouth Opening: Normal  TM Distance: Normal  Tongue: Normal  Neck ROM: Normal ROM    Dental:  Intact    Chest/Lungs:  Clear to auscultation, Normal Respiratory Rate    Heart:  Rate: Normal  Rhythm: Regular Rhythm        Anesthesia Plan  Type of Anesthesia, risks & benefits discussed:    Anesthesia Type: Gen Natural Airway, Regional  Intra-op Monitoring Plan: Standard ASA Monitors  Post Op Pain Control Plan: multimodal analgesia and peripheral nerve block  Induction:  IV  Informed Consent: Informed consent signed with the Patient and all parties understand the risks and agree with anesthesia plan.  All questions answered. Patient consented to blood products? Yes  ASA Score: 2  Day of Surgery Review of History & Physical: H&P Update referred to the  surgeon/provider.    Ready For Surgery From Anesthesia Perspective.     .

## 2023-12-13 NOTE — PLAN OF CARE
Pre op complete. Waiting on anesthesia consent, site leann, update. Pt's  at bedside; he has phone, clothes in locker. Pt resting comfortably with all questions addressed at this time and call light in reach.

## 2023-12-13 NOTE — TRANSFER OF CARE
"Anesthesia Transfer of Care Note    Patient: Amanda Valdez    Procedure(s) Performed: Procedure(s) (LRB):  CORRECTION, HAMMER TOE 3rd toe (Right)    Patient location: PACU    Anesthesia Type: general    Transport from OR: Transported from OR on room air with adequate spontaneous ventilation    Post pain: adequate analgesia    Post assessment: tolerated procedure well and no apparent anesthetic complications    Post vital signs: stable    Level of consciousness: alert, awake and oriented    Nausea/Vomiting: no nausea/vomiting    Complications: none    Transfer of care protocol was followed      Last vitals: Visit Vitals  /65 (BP Location: Right arm, Patient Position: Lying)   Pulse 63   Temp 36.4 °C (97.5 °F) (Temporal)   Resp 15   Ht 5' 8" (1.727 m)   Wt 95.7 kg (211 lb)   LMP  (LMP Unknown)   SpO2 100%   Breastfeeding No   BMI 32.08 kg/m²     "

## 2023-12-13 NOTE — OP NOTE
Operative report   Date of surgery:  12/13/2023     Preop diagnosis:  Right 3rd hammertoe     Postop diagnosis:  Same    Procedure:  Right 3rd hammertoe repair, PIP resection arthroplasty and extensor tenotomy    Anesthesia:  Regional block    Surgeon:  Dr. Lawrence  Assistant:  Dr. Carrillo    Complications:  None   Estimated blood loss:  None  Specimens:  None     Implants:  Ossiofiber hammertoe implant, medium angled    Procedure in detail:  After regional anesthesia was administered in the preop holding area, the patient was brought to the operating room and placed on the operating table in a supine position.  The patient's right leg was prepped and draped in a sterile fashion.  The right foot was exsanguinated with Esmarch bandage and this was left around the ankle as a tourniquet.  A dorsal longitudinal incision was made centered over the right 3rd toe PIP joint contracture.  The incision was carried through skin and subcutaneous tissue and through the extensor tendon down to bone of the proximal phalanx and extending distally across the PIP joint.  A T-shaped capsulotomy was performed exposing the PIP joint.  Microsagittal saw was used to remove the articular surface of the proximal phalanx and the base of the middle phalanx.   hole was made with a K-wire in the proximal phalanx and the middle phalanx.  Two drill holes were then made through the  holes.  The arthroplasty site was well irrigated and the Ossiofiber hammertoe implant was placed in the proximal phalanx and the middle phalanx was reduced onto the end of the implant.  Good correction at the PIP joint was obtained.  There was still some hyperextension at the MTP joint and I made a small incision proximal to the MTP joint and located the extensor tendons and performed an extensor tenotomy with a 15 blade.  Once this was completed the wound was again irrigated.  The extensor tendon and joint capsule were closed with three 0 Vicryl suture over  the PIP joint.  The skin incision was closed with 3-0 nylon suture.  A sterile compressive dressing was placed and the patient was returned the postop holding area in stable condition.

## 2023-12-14 NOTE — ANESTHESIA POSTPROCEDURE EVALUATION
Anesthesia Post Evaluation    Patient: Amanda Valdez    Procedure(s) Performed: Procedure(s) (LRB):  CORRECTION, HAMMER TOE 3rd toe (Right)    Final Anesthesia Type: general      Patient location during evaluation: PACU  Patient participation: Yes- Able to Participate  Level of consciousness: awake and alert and oriented  Post-procedure vital signs: reviewed and stable  Pain management: adequate  Airway patency: patent    PONV status at discharge: No PONV  Anesthetic complications: no      Cardiovascular status: blood pressure returned to baseline and hemodynamically stable  Respiratory status: unassisted, room air and spontaneous ventilation  Hydration status: euvolemic  Follow-up not needed.              Vitals Value Taken Time   /69 12/13/23 1431   Temp  12/14/23 0735   Pulse 63 12/13/23 1445   Resp 29 12/13/23 1445   SpO2 100 % 12/13/23 1444   Vitals shown include unvalidated device data.      Event Time   Out of Recovery 14:30:00         Pain/Jaclyn Score: Pain Rating Prior to Med Admin: 2 (12/13/2023 10:49 AM)  Jaclyn Score: 10 (12/13/2023  2:00 PM)

## 2023-12-28 ENCOUNTER — OFFICE VISIT (OUTPATIENT)
Dept: ORTHOPEDICS | Facility: CLINIC | Age: 63
End: 2023-12-28
Payer: COMMERCIAL

## 2023-12-28 VITALS — WEIGHT: 211 LBS | HEIGHT: 68 IN | BODY MASS INDEX: 31.98 KG/M2

## 2023-12-28 DIAGNOSIS — M20.41 HAMMER TOE OF RIGHT FOOT: Primary | ICD-10-CM

## 2023-12-28 PROCEDURE — 99024 PR POST-OP FOLLOW-UP VISIT: ICD-10-PCS | Mod: S$GLB,,, | Performed by: PHYSICIAN ASSISTANT

## 2023-12-28 PROCEDURE — 1160F RVW MEDS BY RX/DR IN RCRD: CPT | Mod: CPTII,S$GLB,, | Performed by: PHYSICIAN ASSISTANT

## 2023-12-28 PROCEDURE — 99999 PR PBB SHADOW E&M-EST. PATIENT-LVL III: CPT | Mod: PBBFAC,,, | Performed by: PHYSICIAN ASSISTANT

## 2023-12-28 PROCEDURE — 1160F PR REVIEW ALL MEDS BY PRESCRIBER/CLIN PHARMACIST DOCUMENTED: ICD-10-PCS | Mod: CPTII,S$GLB,, | Performed by: PHYSICIAN ASSISTANT

## 2023-12-28 PROCEDURE — 99999 PR PBB SHADOW E&M-EST. PATIENT-LVL III: ICD-10-PCS | Mod: PBBFAC,,, | Performed by: PHYSICIAN ASSISTANT

## 2023-12-28 PROCEDURE — 1159F MED LIST DOCD IN RCRD: CPT | Mod: CPTII,S$GLB,, | Performed by: PHYSICIAN ASSISTANT

## 2023-12-28 PROCEDURE — 99024 POSTOP FOLLOW-UP VISIT: CPT | Mod: S$GLB,,, | Performed by: PHYSICIAN ASSISTANT

## 2023-12-28 PROCEDURE — 1159F PR MEDICATION LIST DOCUMENTED IN MEDICAL RECORD: ICD-10-PCS | Mod: CPTII,S$GLB,, | Performed by: PHYSICIAN ASSISTANT

## 2023-12-28 NOTE — PROGRESS NOTES
Postoperative Visit  DOS 12/13/23    History of Present Illness:   Amanda Valdez is s/p right 3rd hammertoe repair, PIP resection arthroplasty and extensor tenotomy  who comes to the office for 2 weeks post op evaluation. Her pain is well controlled and the patient is not taking narcotic pain medications. The patient is wearing the post op shoe.  She has been compliant with weight bearing restrictions and elevation instructions.  She denies fevers or chills.       Physical Examination:  She is alert, awake and oriented to time, place and person. She does not appear to be in any distress, and denies any constitutional symptoms. Examination of the right foot demonstrates healing  incision with no erythema or drainage.  There is mild appropriate post op swelling.    Assessment/Plan:  2 weeks s/p right 3rd hammertoe repair  Sutures were removed today and steri-strips applied  Ok to advance WBAT  Shoe wear as tolerated  No refills needed   Follow up in 4 weeks with Dr. Lawrence    All questions were answered in detail. The patient is in full agreement with the treatment plan and will proceed accordingly.

## 2024-01-25 ENCOUNTER — OFFICE VISIT (OUTPATIENT)
Dept: ORTHOPEDICS | Facility: CLINIC | Age: 64
End: 2024-01-25
Payer: COMMERCIAL

## 2024-01-25 VITALS — BODY MASS INDEX: 31.98 KG/M2 | HEIGHT: 68 IN | WEIGHT: 211 LBS

## 2024-01-25 DIAGNOSIS — M20.41 HAMMER TOE OF RIGHT FOOT: ICD-10-CM

## 2024-01-25 DIAGNOSIS — Z09 FOLLOW-UP EXAMINATION AFTER ORTHOPEDIC SURGERY: Primary | ICD-10-CM

## 2024-01-25 PROCEDURE — 99999 PR PBB SHADOW E&M-EST. PATIENT-LVL III: CPT | Mod: PBBFAC,,, | Performed by: ORTHOPAEDIC SURGERY

## 2024-01-25 PROCEDURE — 99024 POSTOP FOLLOW-UP VISIT: CPT | Mod: S$GLB,,, | Performed by: ORTHOPAEDIC SURGERY

## 2024-01-25 PROCEDURE — 1159F MED LIST DOCD IN RCRD: CPT | Mod: CPTII,S$GLB,, | Performed by: ORTHOPAEDIC SURGERY

## 2024-01-25 RX ORDER — GABAPENTIN 300 MG/1
300 CAPSULE ORAL NIGHTLY
Qty: 42 CAPSULE | Refills: 0 | Status: SHIPPED | OUTPATIENT
Start: 2024-01-25 | End: 2024-03-12 | Stop reason: SDUPTHER

## 2024-01-25 NOTE — PROGRESS NOTES
Amanda Valdez  Returns today for follow-up.  She is now about six weeks postop from her right 3rd hammertoe repair.  She returns today and reports that overall she is doing okay.  She reports some continued swelling and soreness in the toe as well as on the plantar aspect of her foot.  She reports that the gabapentin helps at night and she is taking Tylenol during the day.    Examination:  The right 3rd toe is indeed swollen as would be expected still.  It is maintaining good alignment.  There is some mild tenderness about the toe and underneath the 3rd MTP joint.    Impression:  1. Follow-up examination after orthopedic surgery  gabapentin (NEURONTIN) 300 MG capsule      2. Hammer toe of right foot, 3rd toe  gabapentin (NEURONTIN) 300 MG capsule            Recommendation:  I reassured her that it is still normal to have swelling.  Her toe is maintaining good alignment.  Hopefully as the swelling goes down the symptoms will improve as well.      Follow-up in three months if necessary

## 2024-03-12 ENCOUNTER — TELEPHONE (OUTPATIENT)
Dept: ORTHOPEDICS | Facility: CLINIC | Age: 64
End: 2024-03-12
Payer: COMMERCIAL

## 2024-03-12 DIAGNOSIS — Z09 FOLLOW-UP EXAMINATION AFTER ORTHOPEDIC SURGERY: ICD-10-CM

## 2024-03-12 DIAGNOSIS — M20.41 HAMMER TOE OF RIGHT FOOT: ICD-10-CM

## 2024-03-12 RX ORDER — GABAPENTIN 300 MG/1
300 CAPSULE ORAL NIGHTLY
Qty: 42 CAPSULE | Refills: 0 | Status: SHIPPED | OUTPATIENT
Start: 2024-03-12 | End: 2024-04-23

## 2024-03-12 NOTE — TELEPHONE ENCOUNTER
I spoke with pt,notified the patient that her refill has been approved and sent to her pharmacy. Pt,verbalized understanding.      ----- Message from Violeta Torres sent at 3/12/2024  8:26 AM CDT -----  Regarding: refill  Contact: [phone]  Who Called: Amanda    Refill or New Rx:refill    RX Name and Strength:gabapentin (NEURONTIN) 300 MG capsule    Preferred Pharmacy with phone number:[facility]/pharmacy #[id number] - [place], [place] - [address] Phone: [phone] Fax:[phone]  Would the patient rather a call back or a response via [facility]? Call back    Best Call Back Number:[phone]    Additional Information:

## 2024-03-19 ENCOUNTER — OFFICE VISIT (OUTPATIENT)
Dept: ORTHOPEDICS | Facility: CLINIC | Age: 64
End: 2024-03-19
Payer: COMMERCIAL

## 2024-03-19 VITALS — WEIGHT: 211 LBS | BODY MASS INDEX: 31.98 KG/M2 | HEIGHT: 68 IN

## 2024-03-19 DIAGNOSIS — G57.61 MORTON'S NEUROMA OF RIGHT FOOT: ICD-10-CM

## 2024-03-19 DIAGNOSIS — M20.41 HAMMER TOE OF RIGHT FOOT: ICD-10-CM

## 2024-03-19 DIAGNOSIS — Z09 FOLLOW-UP EXAMINATION AFTER ORTHOPEDIC SURGERY: Primary | ICD-10-CM

## 2024-03-19 PROCEDURE — 99213 OFFICE O/P EST LOW 20 MIN: CPT | Mod: 25,S$GLB,, | Performed by: ORTHOPAEDIC SURGERY

## 2024-03-19 PROCEDURE — 99999 PR PBB SHADOW E&M-EST. PATIENT-LVL III: CPT | Mod: PBBFAC,,, | Performed by: ORTHOPAEDIC SURGERY

## 2024-03-19 PROCEDURE — 64455 NJX AA&/STRD PLTR COM DG NRV: CPT | Mod: RT,S$GLB,, | Performed by: ORTHOPAEDIC SURGERY

## 2024-03-19 PROCEDURE — 1159F MED LIST DOCD IN RCRD: CPT | Mod: CPTII,S$GLB,, | Performed by: ORTHOPAEDIC SURGERY

## 2024-03-19 RX ORDER — METHYLPREDNISOLONE ACETATE 40 MG/ML
40 INJECTION, SUSPENSION INTRA-ARTICULAR; INTRALESIONAL; INTRAMUSCULAR; SOFT TISSUE
Status: COMPLETED | OUTPATIENT
Start: 2024-03-19 | End: 2024-03-19

## 2024-03-19 RX ADMIN — METHYLPREDNISOLONE ACETATE 40 MG: 40 INJECTION, SUSPENSION INTRA-ARTICULAR; INTRALESIONAL; INTRAMUSCULAR; SOFT TISSUE at 11:03

## 2024-03-19 NOTE — PROGRESS NOTES
Amanda Valdez  Returns today for follow-up.  She is now three months postop from her right 3rd hammertoe repair.  Her last visit was on 01/25/2024 at which time she was reporting continued swelling and soreness of the toe as well as on the plantar aspect of the foot.  Today she reports that her 3rd toe is doing well.  She also has chronic metatarsalgia pain in the 3rd intermetatarsal space consistent with a neuroma which she states has been bothering her more.    Examination:  The right 3rd toe reveals some continued mild swelling but significantly improved from previous in his maintaining good alignment.  She has focal tenderness in the 3rd intermetatarsal space that would be consistent with a Fitch's neuroma.    Impression:  1. Follow-up examination after orthopedic surgery        2. Hammer toe of right foot, 3rd toe        3. 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  I suggested we could do a diagnostic/therapeutic injection of the right 3rd space to assess for Fitch's neuroma.  She agreed to the injection.  After verbal consent and sterile prep I injected the right foot 3rd intermetatarsal space with 2 cc of lidocaine and 40 mg of methylprednisolone.    Follow-up in four months if necessary

## 2025-06-03 ENCOUNTER — TELEPHONE (OUTPATIENT)
Dept: ORTHOPEDICS | Facility: CLINIC | Age: 65
End: 2025-06-03
Payer: COMMERCIAL

## 2025-06-09 ENCOUNTER — TELEPHONE (OUTPATIENT)
Dept: PODIATRY | Facility: CLINIC | Age: 65
End: 2025-06-09
Payer: COMMERCIAL

## 2025-06-09 NOTE — TELEPHONE ENCOUNTER
Called patient and made her aware that the podiatrist in [place] do not perform Cyro procedures. I gave her Dr. Markham in [place], he used to perform this in the past but I do not know if he does this now.  The patient said she will call to see.   I gave her the phone # to his office.     Becca

## (undated) DEVICE — LOOP VESSEL BLUE MAXI

## (undated) DEVICE — SHOE CAST POST-OP MEN SBUNION

## (undated) DEVICE — COVER LIGHT HANDLE 80/CA

## (undated) DEVICE — TRAY MINOR ORTHO OMC

## (undated) DEVICE — GAUZE SPONGE 4X4 12PLY

## (undated) DEVICE — GLOVE SENSICARE PI GRN 8

## (undated) DEVICE — BANDAGE MATRIX HK LOOP 4IN 5YD

## (undated) DEVICE — DRESSING XEROFORM NONADH 1X8IN

## (undated) DEVICE — COVER CAMERA OPERATING ROOM

## (undated) DEVICE — STOCKINETTE TUBULAR 2PL 6 X 4

## (undated) DEVICE — BANDAGE ESMARK 6X12

## (undated) DEVICE — SUT ETHILON 3-0 PS2 18 BLK

## (undated) DEVICE — DRAPE TOP 53X102IN

## (undated) DEVICE — BANDAGE DERMACEA STRETCH 4X1IN

## (undated) DEVICE — SOL NACL IRR 1000ML BTL

## (undated) DEVICE — BLADE SAGITTAL FINE 5.5 X 18.5

## (undated) DEVICE — GLOVE SENSICARE PI MICRO 7.5

## (undated) DEVICE — PAD CAST SPECIALIST STRL 4

## (undated) DEVICE — SPONGE GAUZE 16PLY 4X4

## (undated) DEVICE — DRAPE T EXTRM SURG 121X128X90

## (undated) DEVICE — ELECTRODE REM PLYHSV RETURN 9